# Patient Record
Sex: FEMALE | Race: WHITE
[De-identification: names, ages, dates, MRNs, and addresses within clinical notes are randomized per-mention and may not be internally consistent; named-entity substitution may affect disease eponyms.]

---

## 2017-01-10 ENCOUNTER — CHARTING TRANS (OUTPATIENT)
Dept: OTHER | Age: 82
End: 2017-01-10

## 2017-04-08 ENCOUNTER — PRIOR ORIGINAL RECORDS (OUTPATIENT)
Dept: OTHER | Age: 82
End: 2017-04-08

## 2017-04-08 ENCOUNTER — HOSPITAL (OUTPATIENT)
Dept: OTHER | Age: 82
End: 2017-04-08
Attending: INTERNAL MEDICINE

## 2017-04-08 LAB
ALT SERPL-CCNC: 33 UNIT/L
AST SERPL-CCNC: 22 UNIT/L
CHOLEST SERPL-MCNC: 162 MG/DL
CHOLEST/HDLC SERPL: 1.9 {RATIO}
HDLC SERPL-MCNC: 87 MG/DL
LDLC SERPL CALC-MCNC: 60 MG/DL
LENGTH OF FAST TIME PATIENT: NORMAL HR
NONHDLC SERPL-MCNC: 75 MG/DL
TRIGLYCERIDE (TRIGP): 76 MG/DL

## 2017-04-10 LAB
ALT (SGPT): 33 U/L
AST (SGOT): 22 U/L
CHOLESTEROL, TOTAL: 162 MG/DL
HDL CHOLESTEROL: 87 MG/DL
LDL CHOLESTEROL: 60 MG/DL
NON-HDL CHOLESTEROL: 75 MG/DL
TOTAL CHOLESTEROL / HDL RATIO: 1.9 RATIO UN
TRIGLYCERIDES: 76 MG/DL

## 2017-04-11 ENCOUNTER — PRIOR ORIGINAL RECORDS (OUTPATIENT)
Dept: OTHER | Age: 82
End: 2017-04-11

## 2017-05-23 PROBLEM — M17.0 PRIMARY OSTEOARTHRITIS OF BOTH KNEES: Status: ACTIVE | Noted: 2017-05-23

## 2017-08-22 ENCOUNTER — PRIOR ORIGINAL RECORDS (OUTPATIENT)
Dept: OTHER | Age: 82
End: 2017-08-22

## 2017-10-18 PROBLEM — M17.11 PRIMARY OSTEOARTHRITIS OF RIGHT KNEE: Status: ACTIVE | Noted: 2017-10-18

## 2018-02-27 ENCOUNTER — MYAURORA ACCOUNT LINK (OUTPATIENT)
Dept: OTHER | Age: 83
End: 2018-02-27

## 2018-02-27 ENCOUNTER — PRIOR ORIGINAL RECORDS (OUTPATIENT)
Dept: OTHER | Age: 83
End: 2018-02-27

## 2018-04-13 ENCOUNTER — PRIOR ORIGINAL RECORDS (OUTPATIENT)
Dept: OTHER | Age: 83
End: 2018-04-13

## 2019-02-28 VITALS
WEIGHT: 190 LBS | HEIGHT: 56 IN | RESPIRATION RATE: 16 BRPM | BODY MASS INDEX: 42.74 KG/M2 | SYSTOLIC BLOOD PRESSURE: 110 MMHG | HEART RATE: 56 BPM | DIASTOLIC BLOOD PRESSURE: 84 MMHG

## 2019-02-28 VITALS
BODY MASS INDEX: 43.87 KG/M2 | HEIGHT: 56 IN | SYSTOLIC BLOOD PRESSURE: 140 MMHG | WEIGHT: 195 LBS | HEART RATE: 56 BPM | DIASTOLIC BLOOD PRESSURE: 60 MMHG

## 2019-03-01 VITALS
DIASTOLIC BLOOD PRESSURE: 80 MMHG | WEIGHT: 196 LBS | HEIGHT: 67 IN | SYSTOLIC BLOOD PRESSURE: 146 MMHG | HEART RATE: 64 BPM | BODY MASS INDEX: 30.76 KG/M2 | RESPIRATION RATE: 16 BRPM

## 2019-03-08 RX ORDER — PRAVASTATIN SODIUM 20 MG
TABLET ORAL
COMMUNITY
End: 2019-04-14 | Stop reason: SDUPTHER

## 2019-03-08 RX ORDER — AMOXICILLIN 500 MG
CAPSULE ORAL
COMMUNITY

## 2019-03-08 RX ORDER — METOPROLOL TARTRATE 50 MG/1
TABLET, FILM COATED ORAL
COMMUNITY
End: 2019-03-19 | Stop reason: SDUPTHER

## 2019-03-08 RX ORDER — HYDROCHLOROTHIAZIDE 25 MG/1
TABLET ORAL
COMMUNITY
End: 2019-03-19 | Stop reason: ALTCHOICE

## 2019-03-12 ENCOUNTER — HOSPITAL (OUTPATIENT)
Dept: OTHER | Age: 84
End: 2019-03-12
Attending: INTERNAL MEDICINE

## 2019-03-12 LAB
ALT SERPL-CCNC: 34 UNIT/L
ALT SERPL-CCNC: 34 UNITS/L
AST SERPL-CCNC: 24 UNIT/L
AST SERPL-CCNC: 24 UNITS/L
CHOLEST SERPL-MCNC: 154 MG/DL
CHOLEST SERPL-MCNC: 154 MG/DL
CHOLEST SERPL-MCNC: NORMAL MG/DL
CHOLEST/HDLC SERPL: 1.9 {RATIO}
CHOLEST/HDLC SERPL: 1.9 {RATIO}
HDLC SERPL-MCNC: 81 MG/DL
HDLC SERPL-MCNC: 81 MG/DL
HDLC SERPL-MCNC: NORMAL MG/DL
LDLC SERPL CALC-MCNC: 59 MG/DL
LDLC SERPL CALC-MCNC: 59 MG/DL
LDLC SERPL CALC-MCNC: NORMAL MG/DL
LENGTH OF FAST TIME PATIENT: 12 HR
LENGTH OF FAST TIME PATIENT: 12 HRS
NONHDLC SERPL-MCNC: 73 MG/DL
NONHDLC SERPL-MCNC: 73 MG/DL
NONHDLC SERPL-MCNC: NORMAL MG/DL
TRIGL SERPL-MCNC: 70 MG/DL
TRIGL SERPL-MCNC: NORMAL MG/DL
TRIGLYCERIDE (TRIGP): 70 MG/DL

## 2019-03-14 RX ORDER — MULTIVIT-MIN/IRON/FOLIC ACID/K 18-600-40
CAPSULE ORAL
COMMUNITY
Start: 2016-03-01

## 2019-03-19 ENCOUNTER — OFFICE VISIT (OUTPATIENT)
Dept: CARDIOLOGY | Age: 84
End: 2019-03-19

## 2019-03-19 VITALS
WEIGHT: 193 LBS | HEIGHT: 67 IN | BODY MASS INDEX: 30.29 KG/M2 | HEART RATE: 64 BPM | RESPIRATION RATE: 16 BRPM | SYSTOLIC BLOOD PRESSURE: 130 MMHG | DIASTOLIC BLOOD PRESSURE: 60 MMHG

## 2019-03-19 DIAGNOSIS — R60.0 LOCALIZED EDEMA: Primary | ICD-10-CM

## 2019-03-19 DIAGNOSIS — I10 HYPERTENSION, BENIGN: ICD-10-CM

## 2019-03-19 PROCEDURE — 99215 OFFICE O/P EST HI 40 MIN: CPT | Performed by: INTERNAL MEDICINE

## 2019-03-19 RX ORDER — FUROSEMIDE 20 MG/1
TABLET ORAL
Qty: 45 TABLET | Refills: 3 | Status: SHIPPED | OUTPATIENT
Start: 2019-03-19 | End: 2019-04-02 | Stop reason: SDUPTHER

## 2019-03-19 RX ORDER — METOPROLOL TARTRATE 50 MG/1
50 TABLET, FILM COATED ORAL 2 TIMES DAILY
Qty: 180 TABLET | Refills: 3 | Status: SHIPPED | OUTPATIENT
Start: 2019-03-19 | End: 2020-02-11 | Stop reason: SDUPTHER

## 2019-03-19 SDOH — HEALTH STABILITY: PHYSICAL HEALTH: ON AVERAGE, HOW MANY DAYS PER WEEK DO YOU ENGAGE IN MODERATE TO STRENUOUS EXERCISE (LIKE A BRISK WALK)?: 0 DAYS

## 2019-03-19 ASSESSMENT — ENCOUNTER SYMPTOMS
FEVER: 0
HEMOPTYSIS: 0
COUGH: 0
CHILLS: 0
HEMATOCHEZIA: 0
ALLERGIC/IMMUNOLOGIC COMMENTS: NO NEW FOOD ALLERGIES
WEIGHT GAIN: 0
WEIGHT LOSS: 0
SUSPICIOUS LESIONS: 0
BRUISES/BLEEDS EASILY: 0

## 2019-04-02 ENCOUNTER — TELEPHONE (OUTPATIENT)
Dept: CARDIOLOGY | Age: 84
End: 2019-04-02

## 2019-04-02 RX ORDER — FUROSEMIDE 20 MG/1
TABLET ORAL
Qty: 90 TABLET | Refills: 1 | Status: SHIPPED | OUTPATIENT
Start: 2019-04-02 | End: 2019-05-02 | Stop reason: DRUGHIGH

## 2019-04-04 ENCOUNTER — TELEPHONE (OUTPATIENT)
Dept: CARDIOLOGY | Age: 84
End: 2019-04-04

## 2019-04-15 RX ORDER — PRAVASTATIN SODIUM 20 MG
TABLET ORAL
Qty: 90 TABLET | Refills: 0 | Status: SHIPPED | OUTPATIENT
Start: 2019-04-15 | End: 2019-07-09 | Stop reason: SDUPTHER

## 2019-04-30 ENCOUNTER — HOSPITAL (OUTPATIENT)
Dept: OTHER | Age: 84
End: 2019-04-30
Attending: INTERNAL MEDICINE

## 2019-04-30 LAB
ALT SERPL-CCNC: 39 UNIT/L
ALT SERPL-CCNC: 39 UNITS/L
AST SERPL-CCNC: 32 UNIT/L
AST SERPL-CCNC: 32 UNITS/L
CHOLEST SERPL-MCNC: 144 MG/DL
CHOLEST SERPL-MCNC: 144 MG/DL
CHOLEST SERPL-MCNC: NORMAL MG/DL
CHOLEST/HDLC SERPL: 1.9 {RATIO}
CHOLEST/HDLC SERPL: 1.9 {RATIO}
HDLC SERPL-MCNC: 76 MG/DL
HDLC SERPL-MCNC: 76 MG/DL
HDLC SERPL-MCNC: NORMAL MG/DL
LDLC SERPL CALC-MCNC: 49 MG/DL
LDLC SERPL CALC-MCNC: 49 MG/DL
LDLC SERPL CALC-MCNC: NORMAL MG/DL
LENGTH OF FAST TIME PATIENT: NORMAL HR
LENGTH OF FAST TIME PATIENT: NORMAL HRS
NONHDLC SERPL-MCNC: 68 MG/DL
NONHDLC SERPL-MCNC: 68 MG/DL
NONHDLC SERPL-MCNC: NORMAL MG/DL
TRIGL SERPL-MCNC: 93 MG/DL
TRIGL SERPL-MCNC: NORMAL MG/DL
TRIGLYCERIDE (TRIGP): 93 MG/DL

## 2019-05-02 ENCOUNTER — OFFICE VISIT (OUTPATIENT)
Dept: CARDIOLOGY | Age: 84
End: 2019-05-02

## 2019-05-02 ENCOUNTER — APPOINTMENT (OUTPATIENT)
Dept: CARDIOLOGY | Age: 84
End: 2019-05-02

## 2019-05-02 VITALS
WEIGHT: 194 LBS | RESPIRATION RATE: 16 BRPM | DIASTOLIC BLOOD PRESSURE: 70 MMHG | HEART RATE: 60 BPM | SYSTOLIC BLOOD PRESSURE: 142 MMHG | BODY MASS INDEX: 30.45 KG/M2 | HEIGHT: 67 IN

## 2019-05-02 DIAGNOSIS — I10 HYPERTENSION, BENIGN: Primary | ICD-10-CM

## 2019-05-02 DIAGNOSIS — E78.00 PURE HYPERCHOLESTEROLEMIA: ICD-10-CM

## 2019-05-02 DIAGNOSIS — R60.0 BILATERAL LEG EDEMA: ICD-10-CM

## 2019-05-02 PROCEDURE — 99214 OFFICE O/P EST MOD 30 MIN: CPT | Performed by: INTERNAL MEDICINE

## 2019-05-02 RX ORDER — FUROSEMIDE 20 MG/1
20 TABLET ORAL EVERY OTHER DAY
COMMUNITY
End: 2019-05-02 | Stop reason: SDUPTHER

## 2019-05-02 RX ORDER — FUROSEMIDE 20 MG/1
20 TABLET ORAL EVERY OTHER DAY
Qty: 20 TABLET | Refills: 1 | Status: SHIPPED | OUTPATIENT
Start: 2019-05-02

## 2019-05-02 ASSESSMENT — ENCOUNTER SYMPTOMS
WEIGHT GAIN: 0
CHILLS: 0
WEIGHT LOSS: 0
COUGH: 0
HEMATOCHEZIA: 0
SUSPICIOUS LESIONS: 0
BRUISES/BLEEDS EASILY: 0
HEMOPTYSIS: 0
FEVER: 0
ALLERGIC/IMMUNOLOGIC COMMENTS: NO NEW FOOD ALLERGIES

## 2019-06-03 ENCOUNTER — HOSPITAL ENCOUNTER (OUTPATIENT)
Dept: PHYSICAL THERAPY | Facility: HOSPITAL | Age: 84
Discharge: HOME | End: 2019-06-03
Attending: ORTHOPAEDIC SURGERY
Payer: MEDICARE

## 2019-06-03 ENCOUNTER — LABORATORY ENCOUNTER (OUTPATIENT)
Dept: LAB | Facility: HOSPITAL | Age: 84
End: 2019-06-03
Attending: ORTHOPAEDIC SURGERY
Payer: MEDICARE

## 2019-06-03 DIAGNOSIS — M17.11 OSTEOARTHRITIS OF RIGHT KNEE: ICD-10-CM

## 2019-06-03 PROCEDURE — 86901 BLOOD TYPING SEROLOGIC RH(D): CPT

## 2019-06-03 PROCEDURE — 85025 COMPLETE CBC W/AUTO DIFF WBC: CPT

## 2019-06-03 PROCEDURE — 36415 COLL VENOUS BLD VENIPUNCTURE: CPT

## 2019-06-03 PROCEDURE — 87081 CULTURE SCREEN ONLY: CPT

## 2019-06-03 PROCEDURE — 86900 BLOOD TYPING SEROLOGIC ABO: CPT

## 2019-06-03 PROCEDURE — 85730 THROMBOPLASTIN TIME PARTIAL: CPT

## 2019-06-03 PROCEDURE — 86850 RBC ANTIBODY SCREEN: CPT

## 2019-06-03 PROCEDURE — 85610 PROTHROMBIN TIME: CPT

## 2019-06-07 ENCOUNTER — ANESTHESIA EVENT (OUTPATIENT)
Dept: SURGERY | Facility: HOSPITAL | Age: 84
DRG: 470 | End: 2019-06-07
Payer: MEDICARE

## 2019-06-10 ENCOUNTER — HOSPITAL ENCOUNTER (INPATIENT)
Facility: HOSPITAL | Age: 84
LOS: 2 days | Discharge: HOME HEALTH | DRG: 470 | End: 2019-06-12
Attending: ORTHOPAEDIC SURGERY | Admitting: ORTHOPAEDIC SURGERY
Payer: MEDICARE

## 2019-06-10 ENCOUNTER — APPOINTMENT (OUTPATIENT)
Dept: GENERAL RADIOLOGY | Facility: HOSPITAL | Age: 84
DRG: 470 | End: 2019-06-10
Attending: PHYSICIAN ASSISTANT
Payer: MEDICARE

## 2019-06-10 ENCOUNTER — ANESTHESIA (OUTPATIENT)
Dept: SURGERY | Facility: HOSPITAL | Age: 84
DRG: 470 | End: 2019-06-10
Payer: MEDICARE

## 2019-06-10 DIAGNOSIS — M17.11 PRIMARY OSTEOARTHRITIS OF RIGHT KNEE: ICD-10-CM

## 2019-06-10 DIAGNOSIS — M17.11 OSTEOARTHRITIS OF RIGHT KNEE: Primary | ICD-10-CM

## 2019-06-10 PROCEDURE — 76942 ECHO GUIDE FOR BIOPSY: CPT | Performed by: ORTHOPAEDIC SURGERY

## 2019-06-10 PROCEDURE — 0SRC0J9 REPLACEMENT OF RIGHT KNEE JOINT WITH SYNTHETIC SUBSTITUTE, CEMENTED, OPEN APPROACH: ICD-10-PCS | Performed by: ORTHOPAEDIC SURGERY

## 2019-06-10 PROCEDURE — 88305 TISSUE EXAM BY PATHOLOGIST: CPT | Performed by: ORTHOPAEDIC SURGERY

## 2019-06-10 PROCEDURE — 73560 X-RAY EXAM OF KNEE 1 OR 2: CPT | Performed by: PHYSICIAN ASSISTANT

## 2019-06-10 PROCEDURE — 3E0T3BZ INTRODUCTION OF ANESTHETIC AGENT INTO PERIPHERAL NERVES AND PLEXI, PERCUTANEOUS APPROACH: ICD-10-PCS | Performed by: ANESTHESIOLOGY

## 2019-06-10 PROCEDURE — 88311 DECALCIFY TISSUE: CPT | Performed by: ORTHOPAEDIC SURGERY

## 2019-06-10 DEVICE — ATTUNE KNEE SYSTEM TIBIAL INSERT ROTATING PLATFORM POSTERIOR STABILIZED 5 6MM AOX
Type: IMPLANTABLE DEVICE | Site: KNEE | Status: FUNCTIONAL
Brand: ATTUNE

## 2019-06-10 DEVICE — ATTUNE KNEE SYSTEM TIBIAL BASE ROTATING PLATFORM SIZE 4 CEMENTED
Type: IMPLANTABLE DEVICE | Site: KNEE | Status: FUNCTIONAL
Brand: ATTUNE

## 2019-06-10 DEVICE — ATTUNE KNEE SYSTEM FEMORAL POSTERIOR STABILIZED SIZE 5 RIGHT CEMENTED
Type: IMPLANTABLE DEVICE | Site: KNEE | Status: FUNCTIONAL
Brand: ATTUNE

## 2019-06-10 DEVICE — CEMENT BONE RADIOPAQ HOWMEDICA: Type: IMPLANTABLE DEVICE | Site: KNEE | Status: FUNCTIONAL

## 2019-06-10 DEVICE — ATTUNE PATELLA MEDIALIZED ANATOMIC 35MM CEMENTED AOX
Type: IMPLANTABLE DEVICE | Site: KNEE | Status: FUNCTIONAL
Brand: ATTUNE

## 2019-06-10 RX ORDER — TIZANIDINE 2 MG/1
2 TABLET ORAL 3 TIMES DAILY PRN
Status: DISCONTINUED | OUTPATIENT
Start: 2019-06-10 | End: 2019-06-11

## 2019-06-10 RX ORDER — KETOROLAC TROMETHAMINE 30 MG/ML
15 INJECTION, SOLUTION INTRAMUSCULAR; INTRAVENOUS EVERY 6 HOURS
Status: COMPLETED | OUTPATIENT
Start: 2019-06-10 | End: 2019-06-11

## 2019-06-10 RX ORDER — OXYCODONE HYDROCHLORIDE 10 MG/1
10 TABLET ORAL EVERY 4 HOURS PRN
Status: DISCONTINUED | OUTPATIENT
Start: 2019-06-10 | End: 2019-06-11

## 2019-06-10 RX ORDER — SODIUM PHOSPHATE, DIBASIC AND SODIUM PHOSPHATE, MONOBASIC 7; 19 G/133ML; G/133ML
1 ENEMA RECTAL ONCE AS NEEDED
Status: DISCONTINUED | OUTPATIENT
Start: 2019-06-10 | End: 2019-06-12

## 2019-06-10 RX ORDER — ACETAMINOPHEN 500 MG
1000 TABLET ORAL ONCE
Status: DISCONTINUED | OUTPATIENT
Start: 2019-06-10 | End: 2019-06-10

## 2019-06-10 RX ORDER — ACETAMINOPHEN 325 MG/1
650 TABLET ORAL ONCE
Status: COMPLETED | OUTPATIENT
Start: 2019-06-10 | End: 2019-06-10

## 2019-06-10 RX ORDER — ONDANSETRON 2 MG/ML
4 INJECTION INTRAMUSCULAR; INTRAVENOUS EVERY 6 HOURS PRN
Status: DISCONTINUED | OUTPATIENT
Start: 2019-06-10 | End: 2019-06-10 | Stop reason: HOSPADM

## 2019-06-10 RX ORDER — HYDRALAZINE HYDROCHLORIDE 20 MG/ML
15 INJECTION INTRAMUSCULAR; INTRAVENOUS EVERY 6 HOURS PRN
Status: DISCONTINUED | OUTPATIENT
Start: 2019-06-10 | End: 2019-06-12

## 2019-06-10 RX ORDER — METOPROLOL TARTRATE 50 MG/1
50 TABLET, FILM COATED ORAL 2 TIMES DAILY
Status: DISCONTINUED | OUTPATIENT
Start: 2019-06-10 | End: 2019-06-12

## 2019-06-10 RX ORDER — POLYETHYLENE GLYCOL 3350 17 G/17G
17 POWDER, FOR SOLUTION ORAL DAILY PRN
Status: DISCONTINUED | OUTPATIENT
Start: 2019-06-10 | End: 2019-06-12

## 2019-06-10 RX ORDER — HYDROMORPHONE HYDROCHLORIDE 1 MG/ML
0.8 INJECTION, SOLUTION INTRAMUSCULAR; INTRAVENOUS; SUBCUTANEOUS EVERY 2 HOUR PRN
Status: DISCONTINUED | OUTPATIENT
Start: 2019-06-10 | End: 2019-06-11

## 2019-06-10 RX ORDER — DOCUSATE SODIUM 100 MG/1
100 CAPSULE, LIQUID FILLED ORAL 2 TIMES DAILY
Status: DISCONTINUED | OUTPATIENT
Start: 2019-06-10 | End: 2019-06-12

## 2019-06-10 RX ORDER — NALOXONE HYDROCHLORIDE 0.4 MG/ML
80 INJECTION, SOLUTION INTRAMUSCULAR; INTRAVENOUS; SUBCUTANEOUS AS NEEDED
Status: DISCONTINUED | OUTPATIENT
Start: 2019-06-10 | End: 2019-06-10 | Stop reason: HOSPADM

## 2019-06-10 RX ORDER — CEFAZOLIN SODIUM/WATER 2 G/20 ML
2 SYRINGE (ML) INTRAVENOUS EVERY 8 HOURS
Status: COMPLETED | OUTPATIENT
Start: 2019-06-10 | End: 2019-06-11

## 2019-06-10 RX ORDER — SODIUM CHLORIDE 9 MG/ML
INJECTION, SOLUTION INTRAVENOUS CONTINUOUS
Status: DISCONTINUED | OUTPATIENT
Start: 2019-06-10 | End: 2019-06-12

## 2019-06-10 RX ORDER — ACETAMINOPHEN 325 MG/1
650 TABLET ORAL 4 TIMES DAILY
Status: DISCONTINUED | OUTPATIENT
Start: 2019-06-10 | End: 2019-06-11

## 2019-06-10 RX ORDER — DIPHENHYDRAMINE HYDROCHLORIDE 50 MG/ML
25 INJECTION INTRAMUSCULAR; INTRAVENOUS ONCE AS NEEDED
Status: ACTIVE | OUTPATIENT
Start: 2019-06-10 | End: 2019-06-10

## 2019-06-10 RX ORDER — CEFAZOLIN SODIUM/WATER 2 G/20 ML
2 SYRINGE (ML) INTRAVENOUS ONCE
Status: DISCONTINUED | OUTPATIENT
Start: 2019-06-10 | End: 2019-06-10 | Stop reason: HOSPADM

## 2019-06-10 RX ORDER — OXYCODONE HYDROCHLORIDE 5 MG/1
5 TABLET ORAL EVERY 4 HOURS PRN
Qty: 40 TABLET | Refills: 0 | Status: SHIPPED | OUTPATIENT
Start: 2019-06-10 | End: 2019-06-12

## 2019-06-10 RX ORDER — OXYCODONE HYDROCHLORIDE 5 MG/1
5 TABLET ORAL EVERY 4 HOURS PRN
Status: DISCONTINUED | OUTPATIENT
Start: 2019-06-10 | End: 2019-06-11

## 2019-06-10 RX ORDER — METOCLOPRAMIDE HYDROCHLORIDE 5 MG/ML
10 INJECTION INTRAMUSCULAR; INTRAVENOUS EVERY 6 HOURS PRN
Status: DISCONTINUED | OUTPATIENT
Start: 2019-06-10 | End: 2019-06-12

## 2019-06-10 RX ORDER — PROCHLORPERAZINE EDISYLATE 5 MG/ML
10 INJECTION INTRAMUSCULAR; INTRAVENOUS EVERY 6 HOURS PRN
Status: DISCONTINUED | OUTPATIENT
Start: 2019-06-10 | End: 2019-06-12

## 2019-06-10 RX ORDER — DIPHENHYDRAMINE HCL 25 MG
25 CAPSULE ORAL EVERY 4 HOURS PRN
Status: DISCONTINUED | OUTPATIENT
Start: 2019-06-10 | End: 2019-06-12

## 2019-06-10 RX ORDER — DIPHENHYDRAMINE HYDROCHLORIDE 50 MG/ML
12.5 INJECTION INTRAMUSCULAR; INTRAVENOUS EVERY 4 HOURS PRN
Status: DISCONTINUED | OUTPATIENT
Start: 2019-06-10 | End: 2019-06-12

## 2019-06-10 RX ORDER — ACETAMINOPHEN 325 MG/1
TABLET ORAL
Status: COMPLETED
Start: 2019-06-10 | End: 2019-06-10

## 2019-06-10 RX ORDER — DOCUSATE SODIUM 100 MG/1
100 CAPSULE, LIQUID FILLED ORAL 2 TIMES DAILY
Qty: 60 CAPSULE | Refills: 0 | Status: SHIPPED | OUTPATIENT
Start: 2019-06-10 | End: 2019-10-31

## 2019-06-10 RX ORDER — ACETAMINOPHEN 500 MG
1000 TABLET ORAL EVERY 4 HOURS PRN
Qty: 80 TABLET | Refills: 0 | Status: SHIPPED | OUTPATIENT
Start: 2019-06-10 | End: 2019-06-12

## 2019-06-10 RX ORDER — SODIUM CHLORIDE, SODIUM LACTATE, POTASSIUM CHLORIDE, CALCIUM CHLORIDE 600; 310; 30; 20 MG/100ML; MG/100ML; MG/100ML; MG/100ML
INJECTION, SOLUTION INTRAVENOUS CONTINUOUS
Status: DISCONTINUED | OUTPATIENT
Start: 2019-06-10 | End: 2019-06-12

## 2019-06-10 RX ORDER — BISACODYL 10 MG
10 SUPPOSITORY, RECTAL RECTAL
Status: DISCONTINUED | OUTPATIENT
Start: 2019-06-10 | End: 2019-06-12

## 2019-06-10 RX ORDER — MELATONIN
325
Status: DISCONTINUED | OUTPATIENT
Start: 2019-06-11 | End: 2019-06-12

## 2019-06-10 RX ORDER — CELECOXIB 200 MG/1
200 CAPSULE ORAL 2 TIMES DAILY
Qty: 60 CAPSULE | Refills: 0 | Status: SHIPPED | OUTPATIENT
Start: 2019-06-10 | End: 2019-10-31

## 2019-06-10 RX ORDER — HYDROMORPHONE HYDROCHLORIDE 1 MG/ML
0.2 INJECTION, SOLUTION INTRAMUSCULAR; INTRAVENOUS; SUBCUTANEOUS EVERY 2 HOUR PRN
Status: DISCONTINUED | OUTPATIENT
Start: 2019-06-10 | End: 2019-06-11

## 2019-06-10 RX ORDER — SODIUM CHLORIDE, SODIUM LACTATE, POTASSIUM CHLORIDE, CALCIUM CHLORIDE 600; 310; 30; 20 MG/100ML; MG/100ML; MG/100ML; MG/100ML
INJECTION, SOLUTION INTRAVENOUS CONTINUOUS
Status: DISCONTINUED | OUTPATIENT
Start: 2019-06-10 | End: 2019-06-10 | Stop reason: HOSPADM

## 2019-06-10 RX ORDER — HYDROMORPHONE HYDROCHLORIDE 1 MG/ML
0.4 INJECTION, SOLUTION INTRAMUSCULAR; INTRAVENOUS; SUBCUTANEOUS EVERY 2 HOUR PRN
Status: DISCONTINUED | OUTPATIENT
Start: 2019-06-10 | End: 2019-06-11

## 2019-06-10 RX ORDER — PRAVASTATIN SODIUM 20 MG
20 TABLET ORAL NIGHTLY
Status: DISCONTINUED | OUTPATIENT
Start: 2019-06-10 | End: 2019-06-12

## 2019-06-10 RX ORDER — ONDANSETRON 2 MG/ML
4 INJECTION INTRAMUSCULAR; INTRAVENOUS EVERY 4 HOURS PRN
Status: DISCONTINUED | OUTPATIENT
Start: 2019-06-10 | End: 2019-06-12

## 2019-06-10 RX ORDER — MEPERIDINE HYDROCHLORIDE 25 MG/ML
12.5 INJECTION INTRAMUSCULAR; INTRAVENOUS; SUBCUTANEOUS AS NEEDED
Status: DISCONTINUED | OUTPATIENT
Start: 2019-06-10 | End: 2019-06-10 | Stop reason: HOSPADM

## 2019-06-10 RX ORDER — OXYCODONE HYDROCHLORIDE 15 MG/1
15 TABLET ORAL EVERY 4 HOURS PRN
Status: DISCONTINUED | OUTPATIENT
Start: 2019-06-10 | End: 2019-06-11

## 2019-06-10 RX ORDER — SENNOSIDES 8.6 MG
17.2 TABLET ORAL NIGHTLY
Status: DISCONTINUED | OUTPATIENT
Start: 2019-06-10 | End: 2019-06-12

## 2019-06-10 NOTE — PROGRESS NOTES
Abbie Nicole   5/7/2019 10:50 AM   Office Visit   MRN:  [id number]   Description: 81year old female Provider: Kael Lawrence MD Department: [facility]     Scanning Cover Sheet     Click to print Yessica Zuluagae 852 for scanning     Office Erythromycin            NAUSEA ONLY     Home Medications:  Reviewed     Review of Systems:  A comprehensive review of systems was negative. No back pain, hip pain, shooting pain below knee.  No numbness or tingling.     Physical Exam:  General: Alert, Emily Mini Patient’s diagnosis and treatment options were reviewed. What osteoarthritis is and what the severity of patient's disease was discussed.   Conservative care with symptomatic management including weight control, physical exercises/therapy, medications, inj

## 2019-06-10 NOTE — PROGRESS NOTES
Destiny Cheema   5/14/2019 2:30 PM   Office Visit   MRN:  [id number]   Description: 81year old female Provider: Westley Myers MD Department: [facility]     Scanning Cover Sheet     Click to print Yessica Circe 533 for scan >=45 mg/dL 86   Risk Factor      <5.0 2.1   Cholesterol, Total      <=200 mg/dL 183   LDL Cholesterol Calc      <130 mg/dL 79      Past Medical History:   Diagnosis Date   • Cataract     • Hypercholesterolemia     • HYPERTENSION              Past Surgi RESPIRATORY: denies shortness of breath, wheezing or cough   CARDIOVASCULAR: denies chest pain or BEATTY; no palpitations   GI: denies nausea, vomiting, constipation, diarrhea; no rectal bleeding; no heartburn  GYNE/: no dysuria, urgency or frequency;   YIMI - has adequate control with antihypertensive     4.  PVC's (premature ventricular contractions)  - note on ECG  ,not in runs                 Instructions      After Visit Summary (Automatic SnapShot taken 5/14/2019)         Additional Documentation     Chelsie

## 2019-06-10 NOTE — OPERATIVE REPORT
TOTAL KNEE OPERATIVE REPORT:  Steve Kramer       [id number]     5/20/1935    PREOP DX: RIGHT  KNEE PRIMARY OSTEOARTHRITIS  POSTOP DX:RIGHT KNEE PRIMARY OSTEOARTHRITIS  PROCEDURE: RIGHT TOTAL KNEE REPLACEMENT  SURGEON: MD FIRST Jose G Ford USING RECTAGULAR SPACER BLOCK. GAPS WERE FOUND TO BE EQUAL. VALGUS/VARUS STRESS TEST WAS STABLE. FEMUR WAS FINISHED OFF WITH CHAMFER AND [address] CUTS IN USUAL FASHION. POSTERIOR FEMORAL OSTEOPHYTES WERE REMOVED.   POSTERIOR CAPSULAR RELEASE WAS DONE CARE

## 2019-06-10 NOTE — ANESTHESIA PREPROCEDURE EVALUATION
PRE-OP EVALUATION    Patient Name: Amos Oswald    Pre-op Diagnosis: Primary osteoarthritis of right knee [M17.11]    Procedure(s):  RIGHT TOTAL KNEE REPLACEMENT    Surgeon(s) and Role:     David Torres MD - Primary    Pre-op vitals reviewed.   Temp: 97 hyperlipidemia                                  Endo/Other    Negative endo/other ROS. Pulmonary    Negative pulmonary ROS. Neuro/Psych    Negative neuro/psych ROS.                                 Past Surg

## 2019-06-10 NOTE — ANESTHESIA POSTPROCEDURE EVALUATION
[facility] Patient Status:  Surgery Admit - Inpt   Age/Gender 81year old female MRN [id number]   [facility] SURGERY Attending Sharyon Meckel, MD   Hosp Day # 0 PCP Karine Mcgregor MD       Anesthesia Post-op Note    Proce

## 2019-06-11 PROBLEM — M17.11 OSTEOARTHRITIS OF RIGHT KNEE: Status: ACTIVE | Noted: 2017-10-18

## 2019-06-11 PROCEDURE — 97150 GROUP THERAPEUTIC PROCEDURES: CPT

## 2019-06-11 PROCEDURE — 97535 SELF CARE MNGMENT TRAINING: CPT

## 2019-06-11 PROCEDURE — 97165 OT EVAL LOW COMPLEX 30 MIN: CPT

## 2019-06-11 PROCEDURE — 85027 COMPLETE CBC AUTOMATED: CPT | Performed by: ORTHOPAEDIC SURGERY

## 2019-06-11 PROCEDURE — 97116 GAIT TRAINING THERAPY: CPT

## 2019-06-11 PROCEDURE — 97161 PT EVAL LOW COMPLEX 20 MIN: CPT

## 2019-06-11 RX ORDER — HYDROCODONE BITARTRATE AND ACETAMINOPHEN 5; 325 MG/1; MG/1
2 TABLET ORAL EVERY 4 HOURS PRN
Status: DISCONTINUED | OUTPATIENT
Start: 2019-06-11 | End: 2019-06-12

## 2019-06-11 RX ORDER — TRAMADOL HYDROCHLORIDE 50 MG/1
50 TABLET ORAL EVERY 6 HOURS PRN
Status: DISCONTINUED | OUTPATIENT
Start: 2019-06-11 | End: 2019-06-12

## 2019-06-11 RX ORDER — ACETAMINOPHEN 325 MG/1
650 TABLET ORAL EVERY 4 HOURS PRN
Status: DISCONTINUED | OUTPATIENT
Start: 2019-06-11 | End: 2019-06-12

## 2019-06-11 RX ORDER — TIZANIDINE 2 MG/1
1 TABLET ORAL 3 TIMES DAILY PRN
Status: DISCONTINUED | OUTPATIENT
Start: 2019-06-11 | End: 2019-06-12

## 2019-06-11 RX ORDER — HYDROMORPHONE HYDROCHLORIDE 1 MG/ML
0.3 INJECTION, SOLUTION INTRAMUSCULAR; INTRAVENOUS; SUBCUTANEOUS EVERY 2 HOUR PRN
Status: DISCONTINUED | OUTPATIENT
Start: 2019-06-11 | End: 2019-06-12

## 2019-06-11 RX ORDER — ALBUTEROL SULFATE 90 UG/1
1 AEROSOL, METERED RESPIRATORY (INHALATION) EVERY 4 HOURS PRN
Status: DISCONTINUED | OUTPATIENT
Start: 2019-06-11 | End: 2019-06-12

## 2019-06-11 RX ORDER — HYDROCODONE BITARTRATE AND ACETAMINOPHEN 5; 325 MG/1; MG/1
1 TABLET ORAL EVERY 4 HOURS PRN
Status: DISCONTINUED | OUTPATIENT
Start: 2019-06-11 | End: 2019-06-12

## 2019-06-11 RX ORDER — ALBUTEROL SULFATE 2.5 MG/3ML
2.5 SOLUTION RESPIRATORY (INHALATION) EVERY 4 HOURS PRN
Status: DISCONTINUED | OUTPATIENT
Start: 2019-06-11 | End: 2019-06-12

## 2019-06-11 RX ORDER — HYDROMORPHONE HYDROCHLORIDE 1 MG/ML
0.2 INJECTION, SOLUTION INTRAMUSCULAR; INTRAVENOUS; SUBCUTANEOUS EVERY 2 HOUR PRN
Status: DISCONTINUED | OUTPATIENT
Start: 2019-06-11 | End: 2019-06-12

## 2019-06-11 NOTE — H&P
[facility] #[id number] (46 year old F)   3SW-A Petr Espinoza MD   Physician   SURGERY, ORTHOPEDIC   Progress Notes   Signed   Date of Service:  6/10/2019 11:54 AM               Signed                 Show:Clear all  []Manual[x]Templa Hemoglobin      12.0 - 16.0 g/dL 14.2   Hematocrit      34.0 - 50.0 % 43.4   MCV      81.0 - 100.0 fL 92.7   MCH      27.0 - 33.2 pg 30.3   MCHC      31.0 - 37.0 g/dL 32.7   Platelet Count      357 - 450 10*3/uL 248   RDW      11.5 - 16.0 % 12.6   MEAN YUSUF vitamin E 100 UNITS Oral Cap Take 100 Units by mouth daily.  Disp:  Rfl:    GLUCOSAMINE COMPLEX OR 2 tablets daily Disp:  Rfl:       ROS:  GENERAL HEALTH: otherwise feels well  SKIN: denies any unusual skin lesions or rashes  EYES: no visual complaints or d No personal or family history of adverse reaction to anesthesia. Her cardiopulmonary examination is in normal limits and she does not have symptoms of angina or ischemia.   METS are >4 and his pre-operative ECG stable with known PVC'  Note sent to referring

## 2019-06-11 NOTE — CONSULTS
General Medicine Consult      Reason for consult: post-op medical management     Consulted by: Dr. Everett Clement    PCP: Tierra Aguillon MD      History of Present Illness: Patient is a 81year old female with PMH sig for HTN, HL  presented for elective .  Prior to mouth every other day. Disp:  Rfl:    Pravastatin Sodium 20 MG Oral Tab Take 20 mg by mouth nightly. Disp:  Rfl:    Metoprolol Tartrate 50 MG Oral Tab Take 50 mg by mouth 2 (two) times daily.    Disp:  Rfl: 3       Scheduled Medication:  • ketorolac (TORADO OBJECTIVE:  /47 (BP Location: Left arm)   Pulse 61   Temp 97.4 °F (36.3 °C) (Oral)   Resp 18   Ht 5' 7\" (1.702 m)   Wt 192 lb 7.4 oz (87.3 kg)   SpO2 94%   BMI 30.14 kg/m²     Wt Readings from Last 6 Encounters:  06/10/19 : 192 lb 7.4 oz (87.3 consultation. [facility] Hospitalist to continue to follow patient while in house. Please call with any questions or concerns.      Terence Rao MD  [facility] Hospitalist  Pager: [phone]

## 2019-06-11 NOTE — PROGRESS NOTES
Patient and coaches ( and daughters) attended group discharge education class. Discharge education provided utilizing \"hip/knee replacement discharge instructions\" sheet. Teach back done. Questions solicited and answered. Tolerated activity well.

## 2019-06-11 NOTE — PLAN OF CARE
AOX4. Slight numbness and tingling in the beginning of the shift but pt reported to RN upon assessment that \"I can fully feel my leg now\". Acewrap dressing is C/D/I. Pt able to wiggle toes, unable to lift RLE thus maintained on bedrest for now.  Voids per

## 2019-06-11 NOTE — CM/SW NOTE
06/11/19 1100   CM/SW Screening   Referral [facility] staff; Chart review;Nursing rounds   Patient's Mental Status Alert;Oriented   Patient's Home Environment Condo/Apt with elevator   Number of Levels in Home

## 2019-06-11 NOTE — OCCUPATIONAL THERAPY NOTE
OCCUPATIONAL THERAPY EVALUATION - INPATIENT     Room Number: 351/351-A  Evaluation Date: 6/11/2019  Type of Evaluation: Initial  Presenting Problem: (R TKA)    Physician Order: IP Consult to Occupational Therapy  Reason for Therapy: ADL/IADL Dysfunction an Techniques: Relaxation;Repositioning    COGNITION  Overall Cognitive Status:  WFL - within functional limits    VISION  Current Vision: wears glasses only for reading    PERCEPTION  Overall Perception Status:   WFL - within functional limits    SENSATION integration for BR mobility , patient completed toileting/transfer with SBA with use of commode frame over toilet. Patient transferred to recliner chair with SBA, LEs elevated and ice applied, patient reported nausea and vomited; nurse notified promptly. problem-focused assessments, limited treatment options    Overall Complexity LOW     OT Discharge Recommendations: Home(family assist during recovery)  OT Device Recommendations: Shower chair    PLAN  OT Treatment Plan: Energy conservation/work simplificat

## 2019-06-11 NOTE — HOME CARE LIAISON
MET WITH PTNT AND OFFERED CHOICE  OF AGENCIES. PTNT AGREEABLE TO [facility]. MET WITH PTNT TO DISCUSS HOME HEALTH SERVICES AND COVERAGE CRITERIA. PTNT AGREEABLE TO [facility]. PTNT GIVEN RESIDENTIAL BROCHURE.  RESIDENTIAL WITH PROVIDE SN/PT ON DISC

## 2019-06-11 NOTE — PLAN OF CARE
Patient with multiple episodes of emesis during AM and into early afternoon, anti-emetics given, pain medications adjusted per pain service. IVF started at 50mL per Dr. Jun Collier. Patient with minimal PO intake due to nausea.  Declines nausea in afternoon [facility]

## 2019-06-11 NOTE — PROGRESS NOTES
[facility] Patient Status:  Inpatient    1935 MRN [id number]   [facility] 3SW-A Attending Gaudencio Chen MD   Hosp Day # 1 PCP Elsi Valencia MD         S:  Patient doing well.  Some nausea yesterday and this morn

## 2019-06-11 NOTE — PHYSICAL THERAPY NOTE
PHYSICAL THERAPY KNEE EVALUATION - INPATIENT     Room Number: 351/351-A  Evaluation Date: 6/11/2019  Type of Evaluation: Initial  Physician Order: PT Eval and Treat    Presenting Problem: s/p right TKA 6/10/19  Reason for Therapy: Mobility Dysfunction and ASSESSMENT  Upper extremity ROM and strength are within functional limits     Lower extremity ROM is within functional limits except right knee due to surgery    Lower extremity strength is within functional limits except right knee due to surgery    [facility] instruction and demonstration of rw use, stood to rw with min a. Pt able to perform right tke, marching in place with no right knee buckling, instructed in gait sequencing. Pt amb with rw with gait training emphasis on heel toe pattern, upright posture. (Obs): BID  Number of Visits to Meet Established Goals: 3      CURRENT GOALS   Goal #1    Patient is able to demonstrate supine - sit EOB @ level: supervision, met 6/11/19    Goal #2    Patient is able to demonstrate transfers Sit to/from Stand at [employer]

## 2019-06-11 NOTE — PLAN OF CARE
Problem: Patient/Family Goals  Goal: Patient/Family Long Term Goal  Description  Patient's Long Term Goal: \"Be walking without a cane. \"      Interventions:  - Keep pain under control  - Perform exercises as instructed by PT/OT  -walk a little further e devices as appropriate  - Consider OT/PT consult to assist with strengthening/mobility  - Encourage toileting schedule  Outcome: Progressing     Problem: DISCHARGE PLANNING  Goal: Discharge to home or other facility with appropriate resources  Description

## 2019-06-11 NOTE — PROGRESS NOTES
Operative leg measured for tubigrip. Size F applied to right calf and size G applied to right knee. Patient and  instructed; verbalized understanding.

## 2019-06-11 NOTE — PHYSICAL THERAPY NOTE
PHYSICAL THERAPY KNEE TREATMENT NOTE - INPATIENT     Room Number: 351/351-A     Session: 1&2   Number of Visits to Meet Established Goals: 3    Presenting Problem: s/p right TKA 6/10/19    Problem List  Active Problems:    Osteoarthritis of right knee AM-PAC Score:  Raw Score: 20   Approx Degree of Impairment: 35.83%   Standardized Score (AM-PAC Scale): 47.67   CMS Modifier (G-Code): CJ    FUNCTIONAL ABILITY STATUS  Gait Assessment   Gait Assistance: Contact guard assist  Distance (ft): [address] daughters and . Knee ROM   R Knee Flexion (degrees): 80     R Knee Extension (degrees): 10       Patient End of Session: With [facility] staff;Needs met; In bathroom - nursing staff aware    ASSESSMENT     The pt seen in PT s/p elective R TKA.  Pt demonstr

## 2019-06-11 NOTE — PLAN OF CARE
Rec'd pt from PACU at 1730 s/p R TKA by Dr. Lashell Rasheed. A & O, family at bedside, IVF infusing, Nerve Block still in effect, denies N/V, BP elevated, notified Dr. Raudel Ventura, order received for Hydralazine PRN, medication administered.   Endorsed to SHOLA DIALLO [facility] RN to estella

## 2019-06-12 VITALS
TEMPERATURE: 99 F | BODY MASS INDEX: 30.2 KG/M2 | DIASTOLIC BLOOD PRESSURE: 54 MMHG | WEIGHT: 192.44 LBS | RESPIRATION RATE: 20 BRPM | SYSTOLIC BLOOD PRESSURE: 128 MMHG | HEART RATE: 68 BPM | OXYGEN SATURATION: 95 % | HEIGHT: 67 IN

## 2019-06-12 PROCEDURE — 97150 GROUP THERAPEUTIC PROCEDURES: CPT

## 2019-06-12 PROCEDURE — 85027 COMPLETE CBC AUTOMATED: CPT | Performed by: ORTHOPAEDIC SURGERY

## 2019-06-12 PROCEDURE — 97535 SELF CARE MNGMENT TRAINING: CPT

## 2019-06-12 PROCEDURE — 97116 GAIT TRAINING THERAPY: CPT

## 2019-06-12 RX ORDER — ONDANSETRON 4 MG/1
4 TABLET, ORALLY DISINTEGRATING ORAL EVERY 4 HOURS PRN
Qty: 15 TABLET | Refills: 1 | Status: SHIPPED | OUTPATIENT
Start: 2019-06-12 | End: 2019-07-05 | Stop reason: ALTCHOICE

## 2019-06-12 RX ORDER — HYDROCODONE BITARTRATE AND ACETAMINOPHEN 5; 325 MG/1; MG/1
1-2 TABLET ORAL EVERY 4 HOURS PRN
Qty: 60 TABLET | Refills: 0 | Status: SHIPPED | OUTPATIENT
Start: 2019-06-12 | End: 2019-06-19 | Stop reason: DRUGHIGH

## 2019-06-12 NOTE — PROGRESS NOTES
[facility] Hospitalist Progress Note     Venson Blunt Patient Status:  Inpatient    1935 MRN [id number]   [facility] 3SW-A Attending Kael Lawrence MD   Hosp Day # 2 PCP Audra Wilkes MD     CC: follow up    SUBJECTIVE:  No CP, SOB, or N Albuterol Sulfate HFA, albuterol sulfate, PEG 3350, magnesium hydroxide, bisacodyl, FLEET ENEMA, ondansetron HCl, Metoclopramide HCl, Prochlorperazine Edisylate, diphenhydrAMINE **OR** diphenhydrAMINE HCl, hydrALAzine HCl            Assessment/Plan:     #

## 2019-06-12 NOTE — PROGRESS NOTES
Orthopedic surgery progress note    Chacorta Gonsalez Patient Status:  Inpatient    1935 MRN [id number]   [facility] 3SW-A Attending Yonas Taylor MD   [facility] Day # 2 PCP Tierra Aguillon MD       Subjective:  No more nausea.   No calf pain,

## 2019-06-12 NOTE — PLAN OF CARE
AOX4. Pt verbalized, \"I am very tired today\". Denies he \"feeling of vomiting\". She was able to have half of her dinner and she was able to keep everything down.  Pain is moderate, started on 1 tab of Norco and she was able to tolerate it without any pro

## 2019-06-12 NOTE — PHYSICAL THERAPY NOTE
PHYSICAL THERAPY KNEE TREATMENT NOTE - INPATIENT     Room Number: 351/351-A     Session: 3  Number of Visits to Meet Established Goals: 3    Presenting Problem: s/p right TKA 6/10/19    Problem List  Active Problems:    Osteoarthritis of right knee      P AM-PAC Score:  Raw Score: 21   Approx Degree of Impairment: 28.97%   Standardized Score (AM-PAC Scale): 50.25   CMS Modifier (G-Code): CJ    FUNCTIONAL ABILITY STATUS  Gait Assessment   Gait Assistance: Supervision  Distance (ft): [address] Home with home health PT    PLAN  PT Treatment Plan: Endurance; Energy conservation;Patient education;Gait training;Range of motion;Strengthening;Transfer training  Rehab Potential : Good  Frequency (Obs): Daily    CURRENT GOALS    Goal #1     Patient is ab

## 2019-06-12 NOTE — PROGRESS NOTES
Acute Pain Service    Post Op Day 2 Ortho Note    Assessed patient in chair. Patient rates pain 3/10 at present - mostly behind right knee. Patient states Baron Fletcher is working well to manage pain; denies itching/nausea/dizziness.     Patient able to bear weight

## 2019-06-13 NOTE — OCCUPATIONAL THERAPY NOTE
OCCUPATIONAL THERAPY TREATMENT NOTE - INPATIENT     Room Number: 351/351-A  Session: 1/1  Number of Visits to Meet Established Goals: 1    Presenting Problem: (R TKA)    History related to current admission:   Admitted for elective R TKA on 6/10.  [facility] inclu (AM-PAC Scale): 44.27  CMS Modifier (G-Code): CJ    FUNCTIONAL TRANSFER ASSESSMENT  Supine to Sit : Supervision  Sit to Stand: Supervision    Skilled Therapy Provided: Patient able to complete functional mobility to BR, and completed toileting with supervi education  Rehab Potential : Good  Frequency (Obs): Daily      OT Goals:   ADL GOALS   Patient will perform lower body dressing with supervision MET 6/12  Patient will perform toileting with supervision MET 6/12     FUNCTIONAL TRANSFER GOALS   Patient will

## 2019-06-13 NOTE — PLAN OF CARE
Patient cleared for discharge from all services. Confirmed cleared from PT from [place]. Updated and in agreement with POC and DC plan. AVS discussed in detail with patient, daughter and spouse, all questions answered.  Patient and family attended Leana Hollis

## 2019-06-13 NOTE — DISCHARGE SUMMARY
Discharge Summary  Patient ID:  Ellie Myers  [id number]  71 year old  5/20/1935    Admit date: 6/10/2019    Discharge date and time: 6/12/19    Attending Physician: No att. providers found     Reason for admission: right knee primary OA    Discharge Diag by mouth as needed.  , Historical    furosemide 20 MG Oral Tab  Take 20 mg by mouth every other day., Historical    Pravastatin Sodium 20 MG Oral Tab  Take 20 mg by mouth nightly., Historical    Metoprolol Tartrate 50 MG Oral Tab  Take 50 mg by mouth 2 (tw

## 2019-06-17 NOTE — CM/SW NOTE
06/17/19 0800   Discharge disposition   Expected discharge disposition Home-Health   Name of Facillity/Home Care/Hospice Residential   Discharge transportation Private car   DC 6/12/19

## 2019-06-28 PROBLEM — Z96.651 STATUS POST TOTAL RIGHT KNEE REPLACEMENT: Status: ACTIVE | Noted: 2019-06-28

## 2019-07-09 RX ORDER — PRAVASTATIN SODIUM 20 MG
TABLET ORAL
Qty: 90 TABLET | Refills: 2 | Status: SHIPPED | OUTPATIENT
Start: 2019-07-09 | End: 2020-02-07 | Stop reason: SDUPTHER

## 2019-10-31 PROBLEM — I25.10 CORONARY ATHEROSCLEROSIS: Status: ACTIVE | Noted: 2019-10-31

## 2019-10-31 PROBLEM — M17.0 PRIMARY OSTEOARTHRITIS OF BOTH KNEES: Status: RESOLVED | Noted: 2017-05-23 | Resolved: 2019-10-31

## 2019-10-31 PROBLEM — M17.11 OSTEOARTHRITIS OF RIGHT KNEE: Status: RESOLVED | Noted: 2017-10-18 | Resolved: 2019-10-31

## 2019-10-31 PROBLEM — Z86.711 HISTORY OF PULMONARY EMBOLUS (PE): Status: ACTIVE | Noted: 2019-10-31

## 2019-10-31 PROBLEM — M17.12 PRIMARY OSTEOARTHRITIS OF LEFT KNEE: Status: ACTIVE | Noted: 2019-10-31

## 2019-11-05 ENCOUNTER — APPOINTMENT (OUTPATIENT)
Dept: CARDIOLOGY | Age: 84
End: 2019-11-05

## 2019-11-07 ENCOUNTER — APPOINTMENT (OUTPATIENT)
Dept: CARDIOLOGY | Age: 84
End: 2019-11-07

## 2020-02-07 RX ORDER — PRAVASTATIN SODIUM 20 MG
TABLET ORAL
Qty: 90 TABLET | Refills: 0 | Status: SHIPPED | OUTPATIENT
Start: 2020-02-07

## 2020-02-11 ENCOUNTER — TELEPHONE (OUTPATIENT)
Dept: CARDIOLOGY | Age: 85
End: 2020-02-11

## 2020-02-11 RX ORDER — METOPROLOL TARTRATE 50 MG/1
50 TABLET, FILM COATED ORAL 2 TIMES DAILY
Qty: 180 TABLET | Refills: 0 | Status: SHIPPED | OUTPATIENT
Start: 2020-02-11

## 2020-07-08 PROBLEM — N39.41 URGE INCONTINENCE: Status: ACTIVE | Noted: 2020-07-08

## (undated) DEVICE — BOWL CEMENT MIX QUICK-VAC

## (undated) DEVICE — UNIVERSAL STERIBUMP® STERILE (5/CASE): Brand: UNIVERSAL STERIBUMP®

## (undated) DEVICE — TOTAL KNEE CDS: Brand: MEDLINE INDUSTRIES, INC.

## (undated) DEVICE — SUTURE STRATAFIX PDS PLUS 45CM

## (undated) DEVICE — 3M™ MICROFOAM™ TAPE 1528-4: Brand: 3M™ MICROFOAM™

## (undated) DEVICE — CONVERTORS STOCKINETTE: Brand: CONVERTORS

## (undated) DEVICE — STERILE POLYISOPRENE POWDER-FREE SURGICAL GLOVES: Brand: PROTEXIS

## (undated) DEVICE — Device: Brand: STABLECUT®

## (undated) DEVICE — ATTUNE PINNING SYSTEM
Type: IMPLANTABLE DEVICE | Site: KNEE
Brand: ATTUNE

## (undated) DEVICE — SOL  .9 1000ML BTL

## (undated) DEVICE — SPECIMEN CONTAINER,POSITIVE SEAL INDICATOR, OR PACKAGED: Brand: PRECISION

## (undated) DEVICE — DRESSING AQUACEL AG 3.5X12

## (undated) DEVICE — 3M™ IOBAN™ 2 ANTIMICROBIAL INCISE DRAPE 6651EZ: Brand: IOBAN™ 2

## (undated) DEVICE — CHLORAPREP 26ML APPLICATOR

## (undated) DEVICE — DRAPE,U/SHT,SPLIT,FILM,60X84,STERILE: Brand: MEDLINE

## (undated) DEVICE — Device

## (undated) DEVICE — SUTURE VICRYL 2-0 CP-1

## (undated) DEVICE — HOOD, PEEL-AWAY: Brand: FLYTE

## (undated) DEVICE — WRAP COOLING KNEE W/ICE PILLOW

## (undated) DEVICE — ZIMMER® STERILE DISPOSABLE TOURNIQUET CUFF WITH PLC, DUAL PORT, SINGLE BLADDER, 34 IN. (86 CM)

## (undated) DEVICE — Device: Brand: PLUMEPEN

## (undated) DEVICE — BANDAGE ELASTIC ACE 6\" X-LONG

## (undated) DEVICE — GOWN SURG AERO CHROME XXL

## (undated) DEVICE — DECANTER BAG 9": Brand: MEDLINE INDUSTRIES, INC.

## (undated) DEVICE — PADDING CAST COTTON  4

## (undated) DEVICE — KENDALL SCD EXPRESS SLEEVES, KNEE LENGTH, MEDIUM: Brand: KENDALL SCD

## (undated) NOTE — LETTER
[facility] Testing Department  Phone: [phone]  OUTSIDE TESTING RESULT REQUEST      TO:   Ulysses Orlando Today's Date: 5/17/19    FAX #: [phone]     IMPORTANT: FOR YOUR IMMEDIATE ATTENTION  Please FAX all test results listed below to: